# Patient Record
Sex: FEMALE | Race: BLACK OR AFRICAN AMERICAN | Employment: FULL TIME | URBAN - METROPOLITAN AREA
[De-identification: names, ages, dates, MRNs, and addresses within clinical notes are randomized per-mention and may not be internally consistent; named-entity substitution may affect disease eponyms.]

---

## 2022-01-11 ENCOUNTER — TELEPHONE (OUTPATIENT)
Dept: BEHAVIORAL/MENTAL HEALTH CLINIC | Age: 72
End: 2022-01-11

## 2022-01-11 NOTE — TELEPHONE ENCOUNTER
Patient said that she saw Dr. Victorino Ramirez some years back and she wanted to schedule a F/U. I could not get her insurance to approve so I told her you would call her back. The insurance ID she gave me was [id number].

## 2022-02-16 ENCOUNTER — TELEPHONE (OUTPATIENT)
Dept: GASTROENTEROLOGY | Age: 72
End: 2022-02-16

## 2022-02-28 ENCOUNTER — TELEPHONE (OUTPATIENT)
Dept: GASTROENTEROLOGY | Age: 72
End: 2022-02-28

## 2022-02-28 VITALS — HEIGHT: 68 IN | WEIGHT: 221 LBS | BODY MASS INDEX: 33.49 KG/M2

## 2022-02-28 DIAGNOSIS — T14.8XXA HEMATOMA: ICD-10-CM

## 2022-02-28 DIAGNOSIS — K57.90 DIVERTICULOSIS: ICD-10-CM

## 2022-02-28 DIAGNOSIS — M99.9 BIOMECHANICAL LESION OF ABDOMINAL REGION: ICD-10-CM

## 2022-02-28 PROBLEM — M25.552 LEFT HIP PAIN: Status: ACTIVE | Noted: 2022-02-28

## 2022-02-28 PROBLEM — I10 HTN (HYPERTENSION): Status: ACTIVE | Noted: 2022-02-28

## 2022-02-28 PROBLEM — R10.9 ABDOMINAL PAIN: Status: RESOLVED | Noted: 2022-02-28 | Resolved: 2022-02-28

## 2022-02-28 PROBLEM — E11.9 DIABETES (HCC): Status: ACTIVE | Noted: 2022-02-28

## 2022-02-28 PROBLEM — R10.9 ABDOMINAL PAIN: Status: ACTIVE | Noted: 2022-02-28

## 2022-02-28 RX ORDER — ACYCLOVIR 400 MG/1
400 TABLET ORAL 2 TIMES DAILY
COMMUNITY
Start: 2022-01-29

## 2022-02-28 RX ORDER — CLONIDINE HYDROCHLORIDE 0.2 MG/1
TABLET ORAL
COMMUNITY
Start: 2022-01-29

## 2022-03-01 ENCOUNTER — OFFICE VISIT (OUTPATIENT)
Dept: GASTROENTEROLOGY | Age: 72
End: 2022-03-01
Payer: COMMERCIAL

## 2022-03-01 VITALS
RESPIRATION RATE: 14 BRPM | SYSTOLIC BLOOD PRESSURE: 142 MMHG | TEMPERATURE: 98.2 F | HEIGHT: 68 IN | HEART RATE: 74 BPM | BODY MASS INDEX: 33.28 KG/M2 | OXYGEN SATURATION: 97 % | WEIGHT: 219.6 LBS | DIASTOLIC BLOOD PRESSURE: 82 MMHG

## 2022-03-01 DIAGNOSIS — R10.32 LEFT LOWER QUADRANT ABDOMINAL PAIN: Primary | ICD-10-CM

## 2022-03-01 DIAGNOSIS — K57.30 DIVERTICULAR DISEASE OF COLON: ICD-10-CM

## 2022-03-01 PROCEDURE — 99204 OFFICE O/P NEW MOD 45 MIN: CPT | Performed by: INTERNAL MEDICINE

## 2022-03-01 RX ORDER — METRONIDAZOLE 500 MG/1
500 TABLET ORAL 3 TIMES DAILY
Qty: 42 TABLET | Refills: 0 | Status: SHIPPED | OUTPATIENT
Start: 2022-03-01

## 2022-03-01 NOTE — PROGRESS NOTES
Brooklynn Burgess is a 73 y.o. female who presents today for the following:  Chief Complaint   Patient presents with    New Patient    Abdominal Pain     l side abd pain since november, hx of diverticulosis         No Known Allergies    Current Outpatient Medications   Medication Sig    metroNIDAZOLE (FLAGYL) 500 mg tablet Take 1 Tablet by mouth three (3) times daily. Indications: diverticulitis    acyclovir (ZOVIRAX) 400 mg tablet Take 400 mg by mouth two (2) times a day.  cloNIDine HCL (CATAPRES) 0.2 mg tablet take 1 tablet by mouth every morning and at bedtime     No current facility-administered medications for this visit.        Past Medical History:   Diagnosis Date    [facility]erv[facility] ([facility]     Diabetes ([facility] 2/28/2022    Diverticulitis     Diverticulosis 2/28/2022    History of prediabetes     HTN (hypertension) 2/28/2022    Left hip pain 2/28/2022    Sarcoidosis of lung [facility])        Past Surgical History:   Procedure Laterality Date    COLONOSCOPY  04/05/2016    [facility]  DIVERTICULOSIS       Family History   Problem Relation Age of Onset    Hypertension Other     Diabetes Other     Cancer Other     Cervical Cancer Other     Lung Cancer Other     Emphysema Other        Social History     Socioeconomic History    Marital status: SINGLE     Spouse name: Not on file    Number of children: Not on file    Years of education: Not on file    Highest education level: Not on file   Occupational History    Not on file   Tobacco Use    Smoking status: Former Smoker    Smokeless tobacco: Never Used   Vaping Use    Vaping Use: Never used   Substance and Sexual Activity    Alcohol use: Never    Drug use: Never    Sexual activity: Not on file   Other Topics Concern    Not on file   Social History Narrative    Not on file     Social Determinants of Health     Financial Resource Strain:     Difficulty of Paying Living Expenses: Not on file   Food Insecurity:     Worried About Running Out of Food in the Last Year: Not on file    Ran Out of Food in the Last Year: Not on file   Transportation Needs:     Lack of Transportation (Medical): Not on file    Lack of Transportation (Non-Medical): Not on file   Physical Activity:     Days of Exercise per Week: Not on file    Minutes of Exercise per Session: Not on file   Stress:     Feeling of Stress : Not on file   Social Connections:     Frequency of Communication with Friends and Family: Not on file    Frequency of Social Gatherings with Friends and Family: Not on file    Attends [employer] Services: Not on file    Active Member of [address] [place] [address] [employer] or Organizations: Not on file    Attends Club or Organization Meetings: Not on file    Marital Status: Not on file   Intimate Partner Violence:     Fear of Current or Ex-Partner: Not on file    Emotionally Abused: Not on file    Physically Abused: Not on file    Sexually Abused: Not on file   Housing Stability:     Unable to Pay for Housing in the Last Year: Not on file    Number of [place] in the Last Year: Not on file    Unstable Housing in the Last Year: Not on file         HPI  51-year-old female with history of hypertension, sarcoidosis of the lung, cervical cancer, and diverticular disease who comes in for evaluation. Patient states she has been having pain in the left lower quadrant of her abdomen. Symptoms have been present since early November 2021. She had an ultrasound of her abdomen at that time which showed what they believe was a hematoma in the subcutaneous abdomen in that region. She states that since gone away. She however continues to have pain in the left lower quadrant. Patient states she may have chills at that time. He has some constipation. She states the pain may improve after a bowel movement. Patient has been taking a prebiotic and probiotic gently. She also drinks dieters tea at times.   She states sometimes she may see blood in her stool which is bright red blood and on wiping. Patient had a colonoscopy on 4/5/2016 which showed left-sided diverticulosis. Review of Systems   Constitutional: Negative. HENT: Negative. Negative for nosebleeds. Eyes: Negative. Respiratory: Negative. Cardiovascular: Negative. Gastrointestinal: Positive for abdominal pain and blood in stool. Negative for constipation, diarrhea, heartburn, melena, nausea and vomiting. Genitourinary: Negative. Musculoskeletal: Positive for joint pain. Skin: Negative. Neurological: Negative. Endo/Heme/Allergies: Negative. Psychiatric/Behavioral: Negative. All other systems reviewed and are negative. Visit Vitals  BP (!) 142/82 (BP 1 Location: Left upper arm, BP Patient Position: Sitting, BP Cuff Size: Adult)   Pulse 74   Temp 98.2 °F (36.8 °C) (Temporal)   Resp 14   Ht 5' 8\" (1.727 m)   Wt 99.6 kg (219 lb 9.6 oz)   SpO2 97% Comment: room air   BMI 33.39 kg/m²     Physical Exam  Vitals and nursing note reviewed. Constitutional:       Appearance: Normal appearance. She is obese. HENT:      Head: Normocephalic and atraumatic. Nose: Nose normal.      Mouth/Throat:      Mouth: Mucous membranes are moist.      Pharynx: Oropharynx is clear. Eyes:      General: No scleral icterus. Conjunctiva/sclera: Conjunctivae normal.      Pupils: Pupils are equal, round, and reactive to light. Cardiovascular:      Rate and Rhythm: Normal rate and regular rhythm. Pulses: Normal pulses. Heart sounds: Normal heart sounds. Pulmonary:      Effort: Pulmonary effort is normal.      Breath sounds: Normal breath sounds. Abdominal:      General: Bowel sounds are normal. There is no distension. Palpations: Abdomen is soft. There is no mass. Tenderness: There is abdominal tenderness. There is no right CVA tenderness, left CVA tenderness, guarding or rebound. Hernia: No hernia is present. Musculoskeletal:         General: Normal range of motion.       Cervical back: Normal range of motion and neck supple. Skin:     General: Skin is warm and dry. Coloration: Skin is not jaundiced. Neurological:      General: No focal deficit present. Mental Status: She is alert and oriented to person, place, and time. Psychiatric:         Mood and Affect: Mood normal.         Behavior: Behavior normal.         Thought Content: Thought content normal.         Judgment: Judgment normal.            1. Left lower quadrant abdominal pain  Probably secondary to diverticulitis. - metroNIDAZOLE (FLAGYL) 500 mg tablet; Take 1 Tablet by mouth three (3) times daily. Indications: diverticulitis  Dispense: 42 Tablet; Refill: 0    2. Diverticular disease of colon  Noted to be in the left colon. - metroNIDAZOLE (FLAGYL) 500 mg tablet; Take 1 Tablet by mouth three (3) times daily. Indications: diverticulitis  Dispense: 42 Tablet;  Refill: 0

## 2022-03-01 NOTE — PROGRESS NOTES
1. Have you been to the ER, urgent care clinic since your last visit? Hospitalized since your last visit? no    2. Have you seen or consulted any other health care providers outside of the [address] [place] [address] since your last visit? Include any pap smears or colon screening.   No   Chief Complaint   Patient presents with    New Patient    Abdominal Pain     l side abd pain since november, hx of diverticulosis     Visit Vitals  BP (!) 142/82 (BP 1 Location: Left upper arm, BP Patient Position: Sitting, BP Cuff Size: Adult)   Pulse 74   Temp 98.2 °F (36.8 °C) (Temporal)   Resp 14   Ht 5' 8\" (1.727 m)   Wt 99.6 kg (219 lb 9.6 oz)   SpO2 97% Comment: room air   BMI 33.39 kg/m²

## 2022-03-18 PROBLEM — I10 HTN (HYPERTENSION): Status: ACTIVE | Noted: 2022-02-28

## 2022-03-18 PROBLEM — K57.90 DIVERTICULOSIS: Status: ACTIVE | Noted: 2022-02-28

## 2022-03-19 PROBLEM — M99.9: Status: ACTIVE | Noted: 2022-02-28

## 2022-03-19 PROBLEM — M25.552 LEFT HIP PAIN: Status: ACTIVE | Noted: 2022-02-28

## 2022-03-20 PROBLEM — R10.9 ABDOMINAL PAIN: Status: ACTIVE | Noted: 2022-02-28

## 2022-04-29 ENCOUNTER — TELEPHONE (OUTPATIENT)
Dept: GASTROENTEROLOGY | Age: 72
End: 2022-04-29

## 2023-01-31 ENCOUNTER — TRANSCRIBE ORDER (OUTPATIENT)
Dept: SCHEDULING | Age: 73
End: 2023-01-31

## 2023-01-31 DIAGNOSIS — Z12.31 SCREENING MAMMOGRAM FOR HIGH-RISK PATIENT: Primary | ICD-10-CM

## 2023-02-09 ENCOUNTER — HOSPITAL ENCOUNTER (OUTPATIENT)
Dept: MAMMOGRAPHY | Age: 73
Discharge: HOME | End: 2023-02-09
Attending: NURSE PRACTITIONER
Payer: COMMERCIAL

## 2023-02-09 DIAGNOSIS — Z12.31 SCREENING MAMMOGRAM FOR HIGH-RISK PATIENT: ICD-10-CM

## 2023-02-09 PROCEDURE — 77063 BREAST TOMOSYNTHESIS BI: CPT

## 2023-02-21 ENCOUNTER — OFFICE VISIT (OUTPATIENT)
Dept: GASTROENTEROLOGY | Age: 73
End: 2023-02-21
Payer: COMMERCIAL

## 2023-02-21 VITALS
BODY MASS INDEX: 32.89 KG/M2 | RESPIRATION RATE: 14 BRPM | OXYGEN SATURATION: 96 % | HEIGHT: 68 IN | TEMPERATURE: 98.4 F | HEART RATE: 89 BPM | WEIGHT: 217 LBS | DIASTOLIC BLOOD PRESSURE: 80 MMHG | SYSTOLIC BLOOD PRESSURE: 160 MMHG

## 2023-02-21 DIAGNOSIS — R10.32 LLQ ABDOMINAL PAIN: Primary | ICD-10-CM

## 2023-02-21 DIAGNOSIS — D86.0 SARCOIDOSIS OF LUNG (HCC): ICD-10-CM

## 2023-02-21 DIAGNOSIS — K57.30 DIVERTICULAR DISEASE OF COLON: ICD-10-CM

## 2023-02-21 RX ORDER — GLUCOSAMINE SULFATE 1500 MG
1000 POWDER IN PACKET (EA) ORAL DAILY
COMMUNITY

## 2023-02-21 RX ORDER — MULTIVITAMIN
1 TABLET ORAL DAILY
COMMUNITY

## 2023-02-21 NOTE — PROGRESS NOTES
1. Have you been to the ER, urgent care clinic since your last visit? Hospitalized since your last visit? NO    2. Have you seen or consulted any other health care providers outside of the [address] [place] [address] since your last visit? Include any pap smears or colon screening.   NO   Chief Complaint   Patient presents with    Follow-up     OV 3-1-22   LLQ ABD PAIN     Visit Vitals  BP (!) 160/80 (BP 1 Location: Left upper arm, BP Patient Position: Sitting, BP Cuff Size: Adult)   Pulse 89   Temp 98.4 °F (36.9 °C) (Temporal)   Resp 14   Ht 5' 8\" (1.727 m)   Wt 98.4 kg (217 lb)   SpO2 96%   BMI 32.99 kg/m²

## 2023-02-25 NOTE — PROGRESS NOTES
Dara Henriquez is a 81 y.o. female who presents today for the following:  Chief Complaint   Patient presents with    Follow-up     OV 3-1-22   LLQ ABD PAIN         No Known Allergies    Current Outpatient Medications   Medication Sig    cholecalciferol (Vitamin D3) 25 mcg (1,000 unit) cap Take 1,000 Units by mouth daily. multivitamin (ONE A DAY) tablet Take 1 Tablet by mouth daily. cloNIDine HCL (CATAPRES) 0.2 mg tablet take 1 tablet by mouth every morning and at bedtime    metroNIDAZOLE (FLAGYL) 500 mg tablet Take 1 Tablet by mouth three (3) times daily. Indications: diverticulitis (Patient not taking: Reported on 2/21/2023)    acyclovir (ZOVIRAX) 400 mg tablet Take 400 mg by mouth two (2) times a day. (Patient not taking: Reported on 2/21/2023)     No current facility-administered medications for this visit.        Past Medical History:   Diagnosis Date    Cervical cancer ([facility]     Diabetes ([facility] 2/28/2022    Diverticulitis     Diverticulosis 2/28/2022    History of prediabetes     HTN (hypertension) 2/28/2022    Left hip pain 2/28/2022    Sarcoidosis of lung [facility])        Past Surgical History:   Procedure Laterality Date    COLONOSCOPY  04/05/2016    [facility]  DIVERTICULOSIS    HX BREAST BIOPSY Right     HX HYSTERECTOMY      HX OOPHORECTOMY         Family History   Problem Relation Age of Onset    Hypertension Other     Diabetes Other     Cancer Other     Cervical Cancer Other     Lung Cancer Other     Emphysema Other        Social History     Socioeconomic History    Marital status: SINGLE     Spouse name: Not on file    Number of children: Not on file    Years of education: Not on file    Highest education level: Not on file   Occupational History    Not on file   Tobacco Use    Smoking status: Former    Smokeless tobacco: Never   Vaping Use    Vaping Use: Never used   Substance and Sexual Activity    Alcohol use: Never    Drug use: Never    Sexual activity: Not on file   Other Topics Concern    Not on file   Social History Narrative    Not on file     Social Determinants of Health     Financial Resource Strain: Not on file   Food Insecurity: Not on file   Transportation Needs: Not on file   Physical Activity: Not on file   Stress: Not on file   Social Connections: Not on file   Intimate Partner Violence: Not on file   Housing Stability: Not on file         HPI  53-year-old female with history of hypertension, sarcoidosis of the lungs, cervical cancer, and diverticular disease who comes in for evaluation of abdominal pain. States she still has pain intermittently in the left lower quadrant, especially prior to a bowel movement which resolves afterwards. She has some straining on defecation. She uses a rare detox tea intermittently. No gross GI bleeding. She is eating well she does pass on and off. She is a prediabetic, but not taken any medication for diabetes presently. Review of Systems   Constitutional: Negative. HENT: Negative. Negative for nosebleeds. Eyes: Negative. Respiratory: Negative. Cardiovascular: Negative. Gastrointestinal:  Positive for abdominal pain and constipation. Negative for blood in stool, diarrhea, heartburn, melena, nausea and vomiting. Genitourinary: Negative. Musculoskeletal:  Positive for joint pain. Skin: Negative. Neurological: Negative. Endo/Heme/Allergies: Negative. Psychiatric/Behavioral: Negative. All other systems reviewed and are negative. Visit Vitals  BP (!) 160/80 (BP 1 Location: Left upper arm, BP Patient Position: Sitting, BP Cuff Size: Adult)   Pulse 89   Temp 98.4 °F (36.9 °C) (Temporal)   Resp 14   Ht 5' 8\" (1.727 m)   Wt 98.4 kg (217 lb)   SpO2 96%   BMI 32.99 kg/m²     Physical Exam  Vitals and nursing note reviewed. Constitutional:       Appearance: Normal appearance. She is obese. HENT:      Head: Normocephalic and atraumatic.       Nose: Nose normal.      Mouth/Throat:      Mouth: Mucous membranes are moist. Pharynx: Oropharynx is clear. Eyes:      General: No scleral icterus. Conjunctiva/sclera: Conjunctivae normal.      Pupils: Pupils are equal, round, and reactive to light. Cardiovascular:      Rate and Rhythm: Normal rate and regular rhythm. Pulses: Normal pulses. Heart sounds: Normal heart sounds. Pulmonary:      Effort: Pulmonary effort is normal.      Breath sounds: Normal breath sounds. Abdominal:      General: Bowel sounds are normal. There is no distension. Palpations: Abdomen is soft. There is no mass. Tenderness: There is abdominal tenderness. There is no right CVA tenderness, left CVA tenderness, guarding or rebound. Hernia: No hernia is present. Musculoskeletal:         General: Normal range of motion. Cervical back: Normal range of motion and neck supple. Skin:     General: Skin is warm and dry. Coloration: Skin is not jaundiced. Neurological:      General: No focal deficit present. Mental Status: She is alert and oriented to person, place, and time. Psychiatric:         Mood and Affect: Mood normal.         Behavior: Behavior normal.         Thought Content: Thought content normal.         Judgment: Judgment normal.          1. LLQ abdominal pain  Likely secondary to diverticular disease. 2. Sarcoidosis of lung ([address]      3.  Diverticular disease of colon

## 2024-04-10 ENCOUNTER — TRANSCRIBE ORDERS (OUTPATIENT)
Facility: HOSPITAL | Age: 74
End: 2024-04-10

## 2024-04-10 DIAGNOSIS — Z12.31 ENCOUNTER FOR SCREENING MAMMOGRAM FOR MALIGNANT NEOPLASM OF BREAST: Primary | ICD-10-CM

## 2024-06-18 ENCOUNTER — OFFICE VISIT (OUTPATIENT)
Age: 74
End: 2024-06-18
Payer: COMMERCIAL

## 2024-06-18 ENCOUNTER — HOSPITAL ENCOUNTER (OUTPATIENT)
Facility: HOSPITAL | Age: 74
Discharge: HOME | End: 2024-06-21
Payer: COMMERCIAL

## 2024-06-18 VITALS — HEIGHT: 68 IN | WEIGHT: 220 LBS | BODY MASS INDEX: 33.34 KG/M2

## 2024-06-18 VITALS
HEART RATE: 106 BPM | RESPIRATION RATE: 14 BRPM | TEMPERATURE: 97.4 F | HEIGHT: 68 IN | BODY MASS INDEX: 33.34 KG/M2 | WEIGHT: 220 LBS | SYSTOLIC BLOOD PRESSURE: 160 MMHG | DIASTOLIC BLOOD PRESSURE: 80 MMHG | OXYGEN SATURATION: 6 %

## 2024-06-18 DIAGNOSIS — Z12.31 ENCOUNTER FOR SCREENING MAMMOGRAM FOR MALIGNANT NEOPLASM OF BREAST: ICD-10-CM

## 2024-06-18 DIAGNOSIS — K57.30 DIVERTICULAR DISEASE OF COLON: Primary | ICD-10-CM

## 2024-06-18 DIAGNOSIS — R10.32 LEFT LOWER QUADRANT ABDOMINAL PAIN: ICD-10-CM

## 2024-06-18 PROCEDURE — 99214 OFFICE O/P EST MOD 30 MIN: CPT | Performed by: INTERNAL MEDICINE

## 2024-06-18 PROCEDURE — 3077F SYST BP >= 140 MM HG: CPT | Performed by: INTERNAL MEDICINE

## 2024-06-18 PROCEDURE — 77063 BREAST TOMOSYNTHESIS BI: CPT

## 2024-06-18 PROCEDURE — 1123F ACP DISCUSS/DSCN MKR DOCD: CPT | Performed by: INTERNAL MEDICINE

## 2024-06-18 PROCEDURE — 3079F DIAST BP 80-89 MM HG: CPT | Performed by: INTERNAL MEDICINE

## 2024-06-18 RX ORDER — MELOXICAM 15 MG/1
15 TABLET ORAL DAILY
COMMUNITY
Start: 2024-05-06

## 2024-06-18 RX ORDER — METRONIDAZOLE 500 MG/1
500 TABLET ORAL 3 TIMES DAILY
Qty: 42 TABLET | Refills: 0 | Status: SHIPPED | OUTPATIENT
Start: 2024-06-18

## 2024-06-18 ASSESSMENT — PATIENT HEALTH QUESTIONNAIRE - PHQ9
SUM OF ALL RESPONSES TO PHQ QUESTIONS 1-9: 0
SUM OF ALL RESPONSES TO PHQ9 QUESTIONS 1 & 2: 0
SUM OF ALL RESPONSES TO PHQ QUESTIONS 1-9: 0
2. FEELING DOWN, DEPRESSED OR HOPELESS: NOT AT ALL
1. LITTLE INTEREST OR PLEASURE IN DOING THINGS: NOT AT ALL
SUM OF ALL RESPONSES TO PHQ QUESTIONS 1-9: 0
SUM OF ALL RESPONSES TO PHQ QUESTIONS 1-9: 0

## 2024-06-18 NOTE — PROGRESS NOTES
1. Have you been to the ER, urgent care clinic since your last visit?  Hospitalized since your last visit? Yes 2 mos ago knee pain    2. Have you seen or consulted any other health care providers outside of the [facility] since your last visit?  Include any pap smears or colon screening.  No   Chief Complaint   Patient presents with    Follow-up     1 YR FOLLOW UP  LLQ ABD PAIN    Abdominal Pain    Bloated     BP (!) 160/80 (Site: Left Upper Arm, Position: Sitting, Cuff Size: Medium Adult)   Pulse (!) 106   Temp 97.4 °F (36.3 °C) (Temporal)   Resp 14   Ht 1.727 m (5' 8\")   Wt 99.8 kg (220 lb)   SpO2 (!) 6%   BMI 33.45 kg/m²

## 2024-12-16 ENCOUNTER — OFFICE VISIT (OUTPATIENT)
Age: 74
End: 2024-12-16
Payer: COMMERCIAL

## 2024-12-16 VITALS
HEART RATE: 82 BPM | RESPIRATION RATE: 18 BRPM | HEIGHT: 68 IN | WEIGHT: 226.6 LBS | OXYGEN SATURATION: 99 % | BODY MASS INDEX: 34.34 KG/M2 | TEMPERATURE: 97.8 F | DIASTOLIC BLOOD PRESSURE: 93 MMHG | SYSTOLIC BLOOD PRESSURE: 156 MMHG

## 2024-12-16 DIAGNOSIS — K57.30 DIVERTICULAR DISEASE OF COLON: Primary | ICD-10-CM

## 2024-12-16 DIAGNOSIS — R10.32 LEFT LOWER QUADRANT ABDOMINAL PAIN: ICD-10-CM

## 2024-12-16 PROCEDURE — 3077F SYST BP >= 140 MM HG: CPT | Performed by: INTERNAL MEDICINE

## 2024-12-16 PROCEDURE — 99213 OFFICE O/P EST LOW 20 MIN: CPT | Performed by: INTERNAL MEDICINE

## 2024-12-16 PROCEDURE — 1123F ACP DISCUSS/DSCN MKR DOCD: CPT | Performed by: INTERNAL MEDICINE

## 2024-12-16 PROCEDURE — 3080F DIAST BP >= 90 MM HG: CPT | Performed by: INTERNAL MEDICINE

## 2024-12-16 NOTE — PROGRESS NOTES
Chief Complaint   Patient presents with    Follow-up     6 month follow up     \"Have you been to the ER, urgent care clinic since your last visit?  Hospitalized since your last visit?\"    NO    “Have you seen or consulted any other health care providers outside our system since your last visit?”    NO

## 2024-12-27 ASSESSMENT — ENCOUNTER SYMPTOMS
ABDOMINAL DISTENTION: 0
BLOOD IN STOOL: 0
ALLERGIC/IMMUNOLOGIC NEGATIVE: 1
DIARRHEA: 0
ABDOMINAL PAIN: 1
ANAL BLEEDING: 0
RECTAL PAIN: 0
CONSTIPATION: 0
NAUSEA: 0
RESPIRATORY NEGATIVE: 1
BACK PAIN: 1
VOMITING: 0

## 2024-12-27 NOTE — PROGRESS NOTES
Adry Benavidez is a 74 y.o. female who presents today for the following:  Chief Complaint   Patient presents with    Follow-up     6 month follow up         No Known Allergies    Current Outpatient Medications   Medication Sig Dispense Refill    meloxicam (MOBIC) 15 MG tablet Take 1 tablet by mouth daily      metroNIDAZOLE (FLAGYL) 500 MG tablet Take 1 tablet by mouth 3 times daily 42 tablet 0    vitamin D 25 MCG (1000 UT) CAPS Take 1 capsule by mouth daily       No current facility-administered medications for this visit.       Past Medical History:   Diagnosis Date    Abdominal pain, left lower quadrant 2023    Cervical cancer ([facility])     Diabetes ([facility]) 2/28/2022    Diverticulitis     Diverticulosis 2/28/2022    History of prediabetes     HTN (hypertension) 2/28/2022    Left hip pain 2/28/2022    Sarcoidosis of lung (H[facility])        Past Surgical History:   Procedure Laterality Date    BREAST BIOPSY Right     COLONOSCOPY  04/05/2016    [facility]  DIVERTICULOSIS    HYSTERECTOMY (CERVIX STATUS UNKNOWN)      OVARY REMOVAL         Family History   Problem Relation Age of Onset    Diabetes Other     Cancer Other     Lung Cancer Other     Emphysema Other     Hypertension Other        Social History     Socioeconomic History    Marital status: Single     Spouse name: Not on file    Number of children: Not on file    Years of education: Not on file    Highest education level: Not on file   Occupational History    Not on file   Tobacco Use    Smoking status: Former    Smokeless tobacco: Never   Vaping Use    Vaping status: Never Used   Substance and Sexual Activity    Alcohol use: Never    Drug use: Never    Sexual activity: Not on file   Other Topics Concern    Not on file   Social History Narrative    Not on file     Social Determinants of Health     Financial Resource Strain: Not on file   Food Insecurity: Not on file   Transportation Needs: Not on file   Physical Activity: Not on file   Stress: Not on file   Social

## 2025-05-30 ENCOUNTER — TRANSCRIBE ORDERS (OUTPATIENT)
Facility: HOSPITAL | Age: 75
End: 2025-05-30

## 2025-05-30 DIAGNOSIS — Z12.31 VISIT FOR SCREENING MAMMOGRAM: Primary | ICD-10-CM

## 2025-06-16 ENCOUNTER — OFFICE VISIT (OUTPATIENT)
Age: 75
End: 2025-06-16
Payer: COMMERCIAL

## 2025-06-16 VITALS
BODY MASS INDEX: 34.45 KG/M2 | DIASTOLIC BLOOD PRESSURE: 79 MMHG | HEART RATE: 66 BPM | HEIGHT: 68 IN | RESPIRATION RATE: 18 BRPM | SYSTOLIC BLOOD PRESSURE: 136 MMHG | TEMPERATURE: 97.9 F | OXYGEN SATURATION: 97 %

## 2025-06-16 DIAGNOSIS — R14.0 ABDOMINAL BLOATING: Primary | ICD-10-CM

## 2025-06-16 DIAGNOSIS — K57.30 DIVERTICULAR DISEASE OF COLON: ICD-10-CM

## 2025-06-16 DIAGNOSIS — R10.32 LEFT LOWER QUADRANT ABDOMINAL PAIN: ICD-10-CM

## 2025-06-16 PROCEDURE — 99214 OFFICE O/P EST MOD 30 MIN: CPT | Performed by: INTERNAL MEDICINE

## 2025-06-16 PROCEDURE — 1123F ACP DISCUSS/DSCN MKR DOCD: CPT | Performed by: INTERNAL MEDICINE

## 2025-06-16 PROCEDURE — 3078F DIAST BP <80 MM HG: CPT | Performed by: INTERNAL MEDICINE

## 2025-06-16 PROCEDURE — 3075F SYST BP GE 130 - 139MM HG: CPT | Performed by: INTERNAL MEDICINE

## 2025-06-16 RX ORDER — CALCIUM CARBONATE 500(1250)
500 TABLET ORAL DAILY
COMMUNITY

## 2025-06-16 RX ORDER — DICYCLOMINE HYDROCHLORIDE 10 MG/1
10 CAPSULE ORAL 3 TIMES DAILY PRN
Qty: 90 CAPSULE | Refills: 5 | Status: SHIPPED | OUTPATIENT
Start: 2025-06-16

## 2025-06-16 RX ORDER — CYCLOBENZAPRINE HCL 10 MG
TABLET ORAL
COMMUNITY
Start: 2025-04-21

## 2025-06-16 RX ORDER — AMLODIPINE BESYLATE 5 MG/1
5 TABLET ORAL DAILY
COMMUNITY
Start: 2025-06-11

## 2025-06-16 NOTE — PROGRESS NOTES
Chief Complaint   Patient presents with    Follow-up     6 month     Diverticulosis     Have you been to the ER, urgent care clinic since your last visit?  Hospitalized since your last visit?   NO    Have you seen or consulted any other health care providers outside our system since your last visit?   NO

## 2025-06-21 ASSESSMENT — ENCOUNTER SYMPTOMS
NAUSEA: 0
BACK PAIN: 1
RESPIRATORY NEGATIVE: 1
ABDOMINAL PAIN: 1
DIARRHEA: 0
ALLERGIC/IMMUNOLOGIC NEGATIVE: 1
ABDOMINAL DISTENTION: 1
BLOOD IN STOOL: 0
VOMITING: 0
RECTAL PAIN: 0
ANAL BLEEDING: 0
CONSTIPATION: 0

## 2025-06-23 ENCOUNTER — HOSPITAL ENCOUNTER (OUTPATIENT)
Facility: HOSPITAL | Age: 75
Discharge: HOME | End: 2025-06-26
Payer: COMMERCIAL

## 2025-06-23 DIAGNOSIS — Z12.31 VISIT FOR SCREENING MAMMOGRAM: ICD-10-CM

## 2025-06-23 PROCEDURE — 77063 BREAST TOMOSYNTHESIS BI: CPT
